# Patient Record
(demographics unavailable — no encounter records)

---

## 2025-07-07 NOTE — PROCEDURE
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 After decongestant and anesthesia were applied the laryngoscope was passed. The nasal cavities, nasopharynx, oropharynx, hypopharynx, and larynx were all examined. Vocal folds were examined during respiration and phonation. The following findings were noted:   Findings: Nose: Septum is midline, turbinates are normal, nasal airways patent, mucosa normal Nasopharynx: Adenoids normal, no masses, eustachian tube normal Oropharynx: Pharyngeal walls symmetric and without lesion. Tonsils/fossae symmetric Hypopharynx: Hypopharynx and pyriform sinuses without lesion. No masses or asymmetry. lymphoid hypertrophy appears to be reduced from previous without evidence of bleeding ulceration or induration Larynx: Epiglottis and aryepiglottic folds were sharp and crisp bilaterally. Bilateral false vocal folds normal appearance. Airway was widely patent. + postcricoid edema, erythema of the vocal process   Strobe Exam Ratings   TVF Appearance: normal, mild erythema of the vocal process TVF Mobility: normal Edema/hypertrophy: normal, + postcricoid edema Mucus on TVF: normal Glottic Closure: normal/adequate Mucosal Wave: normal Amplitude of Vibration: normal Phase: symmetric Supraglottic Hyperfunction: none Other Findings: none   Condition: Stable. Patient tolerated procedure well.   Complications: None   --------------------------------------------------------------------   Procedure: Pharyngeal and speech evaluation, by cine or video - CPT 43540 Voice assessment was recorded via video recording on this date.   Clarity: Reduced Range: Reduced Pitch Control: Reduced Projection: Reduced Tremor: none Cough: none   Condition: Stable. Patient tolerated procedure well. Complications: None.

## 2025-07-07 NOTE — HISTORY OF PRESENT ILLNESS
[de-identified] : 6/5/25: 23 y/o M presents with intermittent sensation of "something in his throat" for the past 5 weeks. Symptoms started spontaneously after he was in finals and overusing his voice. He feels symptoms are improving. When he touches his neck he feels there is something on the left. No issues eating, chewing, or swallowing, but he does feel sensation when he swallows. No issues breathing. Weight has been stable. He feels his voice is affected by this and is lower pitched than his baseline. Currently in school for [employer], moderate voice demands giving presentations. No fevers, night sweats, or unintentional weight loss. Patient vapes.   Diet: +tomato, garlic, onion, carbonated beverages, soda  -coffee, tea, chocolate, mint, citrus, blueberry, spicy food water intake: 2 cups late night eating: sometimes  - [FreeTextEntry1] : 7/7/25 Patient initially did take the Medrol Dosepak x 1 week and then was following low acid diet and taking famotidine daily, reports some improvement in terms of symptoms.  Initially he felt that symptoms were worse and when he was lying supine he felt that his throat was closing though he never actually had any issues breathing.  No issues chewing eating or swallowing.  He feels that over the past week symptoms have now significantly improved.

## 2025-07-07 NOTE — ASSESSMENT
[FreeTextEntry1] : - 6/5/25: 21 y/o M presents with intermittent sensation of "something in his throat" for the past 5 weeks. He feels his voice is affected by this and is lower pitched than his baseline. On physical exam he was found to have lymphoid hypertrophy of hypopharynx likely from allergies or recent upper respiratory infection. I am recommending increased hydration and Medrol dose pack to reduce inflammation. Based on history and physical exam, I do believe there is a component of laryngopharyngeal reflux. At this time I am recommending dietary and behavioral change to reduce acid in the diet. I am also recommending famotidine for a 3 months trial. I also recommending smoking cessation. Follow up in 3 months, sooner for any new/ worsening issues. If symptoms persist consider CT neck.    7/7/2025 Patient as of today is actually doing much better than 1 month ago.  Feels that there is been an improvement in symptoms.  I recommended continuing with reflux medications and he is can to get his refill today.  Additionally base of tongue exam appears to be unremarkable other than some hypertrophy but without evidence or concern for tumor or mass lesion.  Patient will follow-up in 2 months, sooner should symptoms worsen or fail to improve.  -Dietary and behavioral modification to reduce acid reflux, handout given -Famotidine qhs, refill given -Voice hygiene, increase hydration, sips of water throughout the day, avoid throat clearing -Smoking cessation -Follow up in 2 months for repeat evaluation